# Patient Record
Sex: FEMALE | Race: WHITE | NOT HISPANIC OR LATINO | URBAN - METROPOLITAN AREA
[De-identification: names, ages, dates, MRNs, and addresses within clinical notes are randomized per-mention and may not be internally consistent; named-entity substitution may affect disease eponyms.]

---

## 2024-09-23 ENCOUNTER — APPOINTMENT (OUTPATIENT)
Dept: OBSTETRICS AND GYNECOLOGY | Facility: CLINIC | Age: 34
End: 2024-09-23
Payer: COMMERCIAL

## 2024-09-23 VITALS — SYSTOLIC BLOOD PRESSURE: 100 MMHG | DIASTOLIC BLOOD PRESSURE: 60 MMHG | BODY MASS INDEX: 27.08 KG/M2 | WEIGHT: 143.3 LBS

## 2024-09-23 DIAGNOSIS — Z01.419 ENCOUNTER FOR WELL WOMAN EXAM WITH ROUTINE GYNECOLOGICAL EXAM: Primary | ICD-10-CM

## 2024-09-23 DIAGNOSIS — Z80.3 FAMILY HISTORY OF BREAST CANCER: ICD-10-CM

## 2024-09-23 PROCEDURE — 1036F TOBACCO NON-USER: CPT | Performed by: NURSE PRACTITIONER

## 2024-09-23 PROCEDURE — 99395 PREV VISIT EST AGE 18-39: CPT | Performed by: NURSE PRACTITIONER

## 2024-09-23 RX ORDER — LEVOTHYROXINE SODIUM 100 UG/1
100 TABLET ORAL
COMMUNITY
Start: 2021-01-11

## 2024-09-23 NOTE — PROGRESS NOTES
HPI:   Izabella Parker is a 34 y.o. who presents today for her annual gynecologic exam without complaints    She has the following concerns; None.   Her mother has a hx of breast cancer at age 45. The patient had negative BRCA testing at age 18.     GYN HISTORY:  Periods are regular every 28-30 days, lasting 5 days.   Dysmenorrhea:none. Cyclic symptoms include none.   No intermenstrual bleeding, spotting, or discharge.    Current contraception: none      Requests STD testing: no     PAP History   Last pap:   2021 Normal HPV Negative  History of abnormal pap: no  HPV vaccine: no  @paphx@    Health Screening  Family history of breast, uterine, ovarian or colon cancer: yes - maternal aunt and maternal grandmother and her mother have a hx of breast cancer.     Breast cancer: mammogram -due at age 35.   Colon cancer: due at age 45       The patient feels safe at home.         Review of Systems:   Constitutional: no fever and no chills.  Cardiovascular: no chest pain.   Respiratory: no shortness of breath.   Gastrointestinal: no nausea, no abdominal pain and no constipation  Genitourinary: no dysuria, no urinary incontinence, no vaginal dryness, no pelvic pain and no vaginal discharge.   Neurological: no headache.  Psychiatric: no anxiety and no depression.              Objective         /60   Wt 65 kg (143 lb 4.8 oz)   LMP 09/14/2024   BMI 27.08 kg/m²         Physical Exam:   Constitutional: Alert and in no acute distress. Well developed, well nourished.      Neck: No neck asymmetry. Supple. Thyroid not enlarged and there were no palpable thyroid nodules.      Cardiovascular: Heart rate and rhythm were normal, normal S1 and S2, no gallops, and no murmurs.      Pulmonary: No respiratory distress. Clear bilateral breath sounds.      Chest: Breasts: Normal appearance, no nipple discharge and no skin changes. Palpation of breasts and axillae: No palpable mass and no axillary lymphadenopathy.      Abdomen: Soft  nontender; no abdominal mass palpated. Normal bowel sounds. No organomegaly.      Genitourinary:   - External genitalia: Normal.   - Palpation of lymph nodes in groin: No inguinal lymphadenopathy.   - Bartholin's Urethral and Skenes Glands: Normal.   - Urethra: Normal.    -Bladder: Normal on palpation.   - Vagina: Normal.   - Cervix: Normal.   - Uterus: Normal. Right Adnexa/parametria: Normal. Left Adnexa/parametria: Normal.   - Perianal Area: Normal.      Skin: Normal skin color and pigmentation, normal skin turgor, and no rash     Psychiatric: Alert and oriented x 3. Affect normal to patient baseline. Mood: Appropriate.            Assessment/Plan       Diagnoses and all orders for this visit:  Encounter for well woman exam with routine gynecological exam  Here for well woman exam. She is doing well. Periods are manageable. She does not want any birth control at this time. She is accepting if she were to have another pregnancy. PAP due next year. Will obtain mammogram next year.   Family history of breast cancer  Due to her mothers history, will obtain baseline mammogram at age 35 (ten years prior to her mother diagnosis). Continues self-breast exams encouraged.   Follow-up annually; sooner if needed.       ARIANA Li-CNP

## 2025-07-03 ENCOUNTER — TELEPHONE (OUTPATIENT)
Dept: OBSTETRICS AND GYNECOLOGY | Facility: CLINIC | Age: 35
End: 2025-07-03
Payer: COMMERCIAL

## 2025-07-03 NOTE — TELEPHONE ENCOUNTER
She is scheduled for an annual with Graciela on 08/04/2025 . Last Annual was  09/23/2024. Please call the patient and let her know she is a little early. She should verify her insurance will cover or postpone annual

## 2025-08-04 ENCOUNTER — APPOINTMENT (OUTPATIENT)
Dept: OBSTETRICS AND GYNECOLOGY | Facility: CLINIC | Age: 35
End: 2025-08-04
Payer: COMMERCIAL

## 2025-11-24 ENCOUNTER — APPOINTMENT (OUTPATIENT)
Dept: OBSTETRICS AND GYNECOLOGY | Facility: CLINIC | Age: 35
End: 2025-11-24
Payer: COMMERCIAL